# Patient Record
Sex: MALE | Race: WHITE
[De-identification: names, ages, dates, MRNs, and addresses within clinical notes are randomized per-mention and may not be internally consistent; named-entity substitution may affect disease eponyms.]

---

## 2018-06-06 ENCOUNTER — HOSPITAL ENCOUNTER (EMERGENCY)
Dept: HOSPITAL 97 - ER | Age: 58
Discharge: HOME | End: 2018-06-06
Payer: COMMERCIAL

## 2018-06-06 DIAGNOSIS — I10: ICD-10-CM

## 2018-06-06 DIAGNOSIS — F32.9: ICD-10-CM

## 2018-06-06 DIAGNOSIS — M25.512: Primary | ICD-10-CM

## 2018-06-06 DIAGNOSIS — F17.210: ICD-10-CM

## 2018-06-06 LAB
ALBUMIN SERPL BCP-MCNC: 3.8 G/DL (ref 3.2–5.5)
ALP SERPL-CCNC: 50 IU/L (ref 42–121)
ALT SERPL W P-5'-P-CCNC: 15 IU/L (ref 10–60)
AST SERPL W P-5'-P-CCNC: 19 IU/L (ref 10–42)
BUN BLD-MCNC: 18 MG/DL (ref 6–20)
GLUCOSE SERPLBLD-MCNC: 133 MG/DL (ref 65–120)
HCT VFR BLD CALC: 45.6 % (ref 39.6–49)
LYMPHOCYTES # SPEC AUTO: 2 K/UL (ref 0.7–4.9)
MCH RBC QN AUTO: 30.2 PG (ref 27–35)
MCV RBC: 87.4 FL (ref 80–100)
PMV BLD: 8.2 FL (ref 7.6–11.3)
POTASSIUM SERPL-SCNC: 3.3 MEQ/L (ref 3.6–5)
RBC # BLD: 5.21 M/UL (ref 4.33–5.43)

## 2018-06-06 PROCEDURE — 80053 COMPREHEN METABOLIC PANEL: CPT

## 2018-06-06 PROCEDURE — 93005 ELECTROCARDIOGRAM TRACING: CPT

## 2018-06-06 PROCEDURE — 85379 FIBRIN DEGRADATION QUANT: CPT

## 2018-06-06 PROCEDURE — 36415 COLL VENOUS BLD VENIPUNCTURE: CPT

## 2018-06-06 PROCEDURE — 99284 EMERGENCY DEPT VISIT MOD MDM: CPT

## 2018-06-06 PROCEDURE — 85025 COMPLETE CBC W/AUTO DIFF WBC: CPT

## 2018-06-06 PROCEDURE — 96374 THER/PROPH/DIAG INJ IV PUSH: CPT

## 2018-06-06 PROCEDURE — 84484 ASSAY OF TROPONIN QUANT: CPT

## 2018-06-06 PROCEDURE — 71045 X-RAY EXAM CHEST 1 VIEW: CPT

## 2018-06-06 PROCEDURE — 96361 HYDRATE IV INFUSION ADD-ON: CPT

## 2018-06-06 NOTE — EKG
Test Date:    2018-06-06               Test Time:    05:28:47

Technician:   CMC                                    

                                                     

MEASUREMENT RESULTS:                                       

Intervals:                                           

Rate:         82                                     

CA:           218                                    

QRSD:         122                                    

QT:           378                                    

QTc:          441                                    

Axis:                                                

P:            38                                     

CA:           218                                    

QRS:          -11                                    

T:            32                                     

                                                     

INTERPRETIVE STATEMENTS:                                       

                                                     

Sinus rhythm with 1st degree AV block

Borderline ECG

Compared to ECG 02/16/2018 22:11:44

First degree AV block now present



Electronically Signed On 06-06-18 07:35:35 CDT by Scooby Blackwood

## 2018-06-06 NOTE — ER
Nurse's Notes                                                                                     

 [facility]                                                                

Name: Shelton Palacios                                                                                

Age: 58 yrs                                                                                       

Sex: Male                                                                                         

: 1960                                                                                   

MRN: [id number]                                                                                   

Arrival Date: 2018                                                                          

Time: 02:21                                                                                       

Account#: [id number]                                                                            

Bed 6                                                                                             

Private MD: ASRANYA Saenz C                                                                            

Diagnosis: Pain in left shoulder                                                                  

                                                                                                  

Presentation:                                                                                     

                                                                                             

02:34 Presenting complaint: Patient states: he is having pain in his left arm axillary area   bb  

      also back pain pt states he thinks he has pleurisy and feels like he could "squeeze         

      under his arm and fluid would come out". Transition of care: patient was not received       

      from another setting of care. Onset of symptoms was 2018. Risk Assessment: Do      

      you want to hurt yourself or someone else? Patient reports no desire to harm self or        

      others. Initial Sepsis Screen: Does the patient meet any 2 criteria? No. Patient's          

      initial sepsis screen is negative. Does the patient have a suspected source of              

      infection? No. Patient's initial sepsis screen is negative. Care prior to arrival: None.    

02:34 Method Of Arrival: Ambulatory                                                           bb  

02:34 Acuity: JERE 3                                                                           bb  

                                                                                                  

Historical:                                                                                       

- Allergies:                                                                                      

02:37 No Known Allergies;                                                                     bb  

- Home Meds:                                                                                      

02:37 Depakote Oral [Active]; Seroquel Oral [Active]; Ambien Oral [Active]; unknown psych med bb  

      [Active];                                                                                   

- PMHx:                                                                                           

02:37 Depression; Hypertension;                                                               bb  

- PSHx:                                                                                           

02:37 back surgery; Cholecystectomy;                                                          bb  

                                                                                                  

- Immunization history:: Adult Immunizations up to date.                                          

- Social history:: Smoking status: Patient uses tobacco products, smokes one pack                 

  cigarettes per day. Patient uses alcohol, but reports only rare drinking.                       

  Patient/guardian denies using street drugs.                                                     

- Ebola Screening: : No symptoms or risks identified at this time.                                

                                                                                                  

                                                                                                  

Screenin:54 Abuse screen: Denies threats or abuse. Nutritional screening: No deficits noted.        ea  

      Tuberculosis screening: No symptoms or risk factors identified. Fall Risk None              

      identified.                                                                                 

                                                                                                  

Assessment:                                                                                       

02:52 General: Appears in no apparent distress. Behavior is calm, cooperative, appropriate    ea  

      for age. Pain: Complains of pain in left arm Pain currently is 9 out of 10 on a pain        

      scale. Quality of pain is described as aching. Neuro: Level of Consciousness is awake,      

      alert, obeys commands, Oriented to person, place, time, situation. Cardiovascular:          

      Patient's skin is warm and dry. Respiratory: Airway is patent Respiratory effort is         

      even, unlabored, Respiratory pattern is regular, symmetrical. GI: No signs and/or           

      symptoms were reported involving the gastrointestinal system. : No signs and/or           

      symptoms were reported regarding the genitourinary system. EENT: No signs and/or            

      symptoms were reported regarding the EENT system. Derm: Skin is pink, warm \T\ dry.         

      Musculoskeletal: Reports pain in left arm.                                                  

03:30 Reassessment: Patient and/or family updated on plan of care and expected duration. Pain ea  

      level reassessed. Patient is alert, oriented x 3, equal unlabored respirations, skin        

      warm/dry/pink.                                                                              

04:03 Reassessment: Patient and/or family updated on plan of care and expected duration. Pain ea  

      level reassessed. Patient is alert, oriented x 3, equal unlabored respirations, skin        

      warm/dry/pink.                                                                              

06:53 Reassessment: Patient and/or family updated on plan of care and expected duration. Pain ea  

      level reassessed. Patient is alert, oriented x 3, equal unlabored respirations, skin        

      warm/dry/pink. Discharge instructions given to patient, verbalized the understanding of     

      instruction.                                                                                

                                                                                                  

Vital Signs:                                                                                      

02:37  / 91; Pulse 115; Resp 20 S; Temp 99.1; Pulse Ox 96% on R/A; Weight 113.4 kg (R); bb  

      Height 5 ft. 10 in. (177.80 cm) (R); Pain 7/10;                                             

04:10  / 75; Pulse 85; Resp 18; Pulse Ox 96% on R/A;                                    ea  

06:56  / 68; Pulse 78; Resp 18; Pulse Ox 99% ; Pain 3/10;                               ea  

02:37 Body Mass Index 35.87 (113.40 kg, 177.80 cm)                                            bb  

                                                                                                  

ED Course:                                                                                        

02:21 Patient arrived in ED.                                                                  es  

02:21 SARANYA Saenz MD is Private Physician.                                                       es  

02:24 Karin Quinn, RN is Primary Nurse.                                                    ea  

02:26 Yariel Love MD is Attending Physician.                                             ps1 

02:36 Triage completed.                                                                       bb  

02:37 Arm band placed on Patient placed in an exam room, on a stretcher, on pulse oximetry.   bb  

02:52 X-ray completed. Portable x-ray completed in exam room. Patient tolerated procedure     kw  

      well.                                                                                       

02:52 Patient has correct armband on for positive identification. Bed in low position. Call   ea  

      light in reach.                                                                             

02:54 CXR XRAY In Process Unspecified.                                                        EDMS

03:19 Inserted saline lock: 22 gauge in right antecubital area, using aseptic technique.      mg2 

      Blood collected. by ELVA Frazier.                                                               

04:20 SARANYA Saenz MD is Referral Physician.                                                      ps1 

05:31 EKG done, by ED staff.                                                                  cc  

06:54 No provider procedures requiring assistance completed. IV discontinued, intact,         ea  

      bleeding controlled, No redness/swelling at site. Pressure dressing applied.                

                                                                                                  

Administered Medications:                                                                         

02:42 Drug: Motrin 800 mg Route: PO;                                                          ea  

06:58 Follow up: Response: No adverse reaction                                                ea  

03:18 Drug: Decadron - Dexamethasone 10 mg Route: IVP; Site: right antecubital;               mg2 

04:00 Follow up: Response: No adverse reaction; Pain is decreased                             ea  

03:19 Drug: NS 0.9% 1000 ml Route: IV; Rate: 1 bolus; Site: right antecubital;                mg2 

06:57 Follow up: Response: No adverse reaction; IV Status: Completed infusion                 ea  

                                                                                                  

                                                                                                  

Outcome:                                                                                          

04:21 Discharge ordered by MD.                                                                ps1 

06:54 Discharged to home ambulatory.                                                          ea  

06:54 Condition: good                                                                             

06:54 Discharge instructions given to patient, Instructed on discharge instructions, follow       

      up and referral plans. medication usage, Demonstrated understanding of instructions,        

      follow-up care, medications.                                                                

06:58 Patient left the ED.                                                                    ea  

                                                                                                  

Signatures:                                                                                       

Dispatcher [employer]                           EDMS                                                 

Yulia Franklin Brenda, RN RN bb Whitley, Kimberlee kw Christian, Chelsea                                                                              

Karin Quinn RN RN ea Singer, Phillip, MD MD   ps1                                                  

Ranjan Escobar RN                    RN   mg2                                                  

                                                                                                  

Corrections: (The following items were deleted from the chart)                                    

02:41 02:37  / 91; Pulse 120bpm; Resp 20bpm; Spontaneous; Pulse Ox 96% RA; Temp 99.1F;  bb  

      113.4 kg Reported; Height 5 ft. 10 in. Reported; BMI: 35.8; Pain 7/10; bb                   

                                                                                                  

**************************************************************************************************

## 2018-06-06 NOTE — EDPHYS
Physician Documentation                                                                           

 [facility]                                                                

Name: Shelton Palacios                                                                                

Age: 58 yrs                                                                                       

Sex: Male                                                                                         

: 1960                                                                                   

MRN: [id number]                                                                                   

Arrival Date: 2018                                                                          

Time: 02:21                                                                                       

Account#: [id number]                                                                            

Bed 6                                                                                             

Private MD: SARANYA Saenz C                                                                            

ED Physician Yariel Love                                                                      

HPI:                                                                                              

                                                                                             

04:22 This 58 yrs old  Male presents to ER via Ambulatory with complaints of Arm     ps1 

      Pain.                                                                                       

04:22 The complaints affect the left arm. Pain onset was 2 days ago. States that it is        ps1 

      non-traumatic. Hx of chronic pain. Sees  for back pain and post surgical         

      pain. Hx of drug abuse on oxycontin and fentanyl but has been clean for a year.             

      additionally states that he has pain in his armpit and worse with deep breathing. No        

      remitting factors. .                                                                        

                                                                                                  

Historical:                                                                                       

- Allergies:                                                                                      

02:37 No Known Allergies;                                                                     bb  

- Home Meds:                                                                                      

02:37 Depakote Oral [Active]; Seroquel Oral [Active]; Ambien Oral [Active]; unknown psych med bb  

      [Active];                                                                                   

- PMHx:                                                                                           

02:37 Depression; Hypertension;                                                               bb  

- PSHx:                                                                                           

02:37 back surgery; Cholecystectomy;                                                          bb  

                                                                                                  

- Immunization history:: Adult Immunizations up to date.                                          

- Social history:: Smoking status: Patient uses tobacco products, smokes one pack                 

  cigarettes per day. Patient uses alcohol, but reports only rare drinking.                       

  Patient/guardian denies using street drugs.                                                     

- Ebola Screening: : No symptoms or risks identified at this time.                                

                                                                                                  

                                                                                                  

ROS:                                                                                              

04:22 Constitutional: Negative for fever, chills, and weight loss, Eyes: Negative for injury, ps1 

      pain, redness, and discharge, ENT: Negative for injury, pain, and discharge,                

      Cardiovascular: Negative for chest pain, palpitations, and edema, Respiratory: Negative     

      for shortness of breath, cough, wheezing, and pleuritic chest pain, Abdomen/GI:             

      Negative for abdominal pain, nausea, vomiting, diarrhea, and constipation, Back:            

      Negative for injury and pain.                                                               

04:22 MS/extremity: Positive for pain, of the left arm.                                           

                                                                                                  

Exam:                                                                                             

04:22 Constitutional:  This is a well developed, well nourished patient who is awake, alert,  ps1 

      and in no acute distress. Head/Face:  Normocephalic, atraumatic. Eyes:  Pupils equal        

      round and reactive to light, extra-ocular motions intact.  Lids and lashes normal.          

      Conjunctiva and sclera are non-icteric and not injected. Chest/axilla:  Normal chest        

      wall appearance and motion.  Nontender with no deformity.  No lesions are appreciated.      

      Cardiovascular:  Regular rate and rhythm.  No gallops, murmurs, or rubs.  Normal PMI,       

      no JVD.  No pulse deficits. Respiratory:  Lungs have equal breath sounds bilaterally,       

      clear to auscultation and percussion.  No rales, rhonchi or wheezes noted.  No              

      increased work of breathing, no retractions or nasal flaring. Abdomen/GI:  Soft,            

      non-tender, with normal bowel sounds.  No distension or tympany.  No guarding or            

      rebound.  No evidence of tenderness throughout. Skin:  Warm, dry with normal turgor.        

      Normal color with no rashes, no lesions, and no evidence of cellulitis. MS/ Extremity:      

      Pulses equal, no cyanosis.  Neurovascular intact.  Full, normal range of motion. Neuro:     

       Awake and alert, GCS 15, oriented to person, place, time, and situation.  Cranial          

      nerves II-XII grossly intact. Sensory grossly intact.                                       

                                                                                                  

Vital Signs:                                                                                      

02:37  / 91; Pulse 115; Resp 20 S; Temp 99.1; Pulse Ox 96% on R/A; Weight 113.4 kg (R); bb  

      Height 5 ft. 10 in. (177.80 cm) (R); Pain 7/10;                                             

04:10  / 75; Pulse 85; Resp 18; Pulse Ox 96% on R/A;                                    ea  

06:56  / 68; Pulse 78; Resp 18; Pulse Ox 99% ; Pain 3/10;                               ea  

02:37 Body Mass Index 35.87 (113.40 kg, 177.80 cm)                                            bb  

                                                                                                  

MDM:                                                                                              

02:38 Patient medically screened.                                                             ps1 

06:02 Data reviewed: vital signs, nurses notes, lab test result(s), EKG.                      ps1 

                                                                                                  

                                                                                             

03:08 Order name: CBC with Diff; Complete Time: 05:52                                         ps1 

                                                                                             

03:08 Order name: CMP; Complete Time: 03:49                                                   ps1 

                                                                                             

02:35 Order name: CXR XRAY                                                                    ps1 

                                                                                             

03:08 Order name: D-Dimer; Complete Time: 04:02                                               ps1 

                                                                                             

03:08 Order name: Troponin (emerg Dept Use Only); Complete Time: 03:52                        ps1 

                                                                                             

04:26 Order name: EKG - Nurse/Tech; Complete Time: 05:31                                      ps1 

                                                                                                  

EC:28 Rate is 82 beats/min. Rhythm is regular. QRS Axis is Normal. MS interval is normal. QRS ps1 

      interval is prolonged. QT interval is normal. No Q waves. T waves are Normal. No ST         

      changes noted. Clinical impression: nsr with IVCD. . Interpreted by me.                     

                                                                                                  

Administered Medications:                                                                         

02:42 Drug: Motrin 800 mg Route: PO;                                                          ea  

06:58 Follow up: Response: No adverse reaction                                                ea  

03:18 Drug: Decadron - Dexamethasone 10 mg Route: IVP; Site: right antecubital;               mg2 

04:00 Follow up: Response: No adverse reaction; Pain is decreased                             ea  

03:19 Drug: NS 0.9% 1000 ml Route: IV; Rate: 1 bolus; Site: right antecubital;                mg2 

06:57 Follow up: Response: No adverse reaction; IV Status: Completed infusion                 ea  

                                                                                                  

                                                                                                  

Disposition:                                                                                      

18 04:21 Discharged to Home. Impression: Pain in left shoulder.                             

- Condition is Stable.                                                                            

- Discharge Instructions: Arthralgia.                                                             

- Prescriptions for Medrol (Yosef) 4 mg Oral Tablets, Dose Pack - take 1 tablet by ORAL             

  route as directed - follow package instructions; 1 packet.                                      

- Medication Reconciliation Form, Thank You Letter, Antibiotic Education, Prescription            

  Opioid Use form.                                                                                

- Follow up: SARANYA Saenz MD; When: As needed; Reason: Recheck today's complaints,                    

  Continuance of care, Re-evaluation by your physician. Follow up: Emergency                      

  Department; When: As needed; Reason: Fever > 102 F, Trouble breathing, Worsening of             

  condition.                                                                                      

- Problem is an ongoing problem.                                                                  

- Symptoms have improved.                                                                         

                                                                                                  

                                                                                                  

                                                                                                  

Signatures:                                                                                       

Dispatcher [employer]                           EDMS                                                 

Cher Patel RN RN   bb                                                   

Karin Quinn RN RN ea Singer, Phillip, MD MD   ps1                                                  

Ranjan Escobar RN                    RN   mg2                                                  

                                                                                                  

Corrections: (The following items were deleted from the chart)                                    

06:58 04:21 2018 04:21 Discharged to Home. Impression: Pain in left shoulder. Condition ea  

      is Stable. Forms are Medication Reconciliation Form, Thank You Letter, Antibiotic           

      Education, Prescription Opioid Use. Follow up: SARANYA Saenz; When: As needed; Reason: Recheck     

      today's complaints, Continuance of care, Re-evaluation by your physician. Follow up:        

      Emergency Department; When: As needed; Reason: Fever > 102 F, Trouble breathing,            

      Worsening of condition. Problem is an ongoing problem. Symptoms have improved. ps1          

                                                                                                  

**************************************************************************************************

## 2018-06-06 NOTE — RAD REPORT
EXAM DESCRIPTION:  Joo Single View6/6/2018 2:53 am

 

CLINICAL HISTORY:  Chest pain

 

COMPARISON:  February 2018

 

FINDINGS:   The lungs appear clear of acute infiltrate. The heart is normal size

 

IMPRESSION:   No acute abnormalities displayed

## 2018-08-16 ENCOUNTER — HOSPITAL ENCOUNTER (EMERGENCY)
Dept: HOSPITAL 97 - ER | Age: 58
Discharge: HOME | End: 2018-08-16
Payer: COMMERCIAL
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
## 2018-08-16 DIAGNOSIS — F17.210: ICD-10-CM

## 2018-08-16 DIAGNOSIS — F32.9: ICD-10-CM

## 2018-08-16 DIAGNOSIS — I10: ICD-10-CM

## 2018-08-16 DIAGNOSIS — Z23: ICD-10-CM

## 2018-08-16 DIAGNOSIS — S05.32XA: Primary | ICD-10-CM
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
## 2018-08-16 PROCEDURE — 99284 EMERGENCY DEPT VISIT MOD MDM: CPT

## 2018-08-16 PROCEDURE — 90714 TD VACC NO PRESV 7 YRS+ IM: CPT
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
## 2019-09-25 ENCOUNTER — HOSPITAL ENCOUNTER (EMERGENCY)
Dept: HOSPITAL 97 - ER | Age: 59
Discharge: HOME | End: 2019-09-25
Payer: COMMERCIAL

## 2019-09-25 VITALS — DIASTOLIC BLOOD PRESSURE: 83 MMHG | SYSTOLIC BLOOD PRESSURE: 132 MMHG | OXYGEN SATURATION: 95 %

## 2019-09-25 VITALS — TEMPERATURE: 98.4 F

## 2019-09-25 DIAGNOSIS — N20.0: ICD-10-CM

## 2019-09-25 DIAGNOSIS — F17.210: ICD-10-CM

## 2019-09-25 DIAGNOSIS — I10: ICD-10-CM

## 2019-09-25 DIAGNOSIS — F32.9: ICD-10-CM

## 2019-09-25 DIAGNOSIS — N39.0: Primary | ICD-10-CM

## 2019-09-25 LAB
ALBUMIN SERPL BCP-MCNC: 3.3 G/DL (ref 3.4–5)
ALP SERPL-CCNC: 54 U/L (ref 45–117)
ALT SERPL W P-5'-P-CCNC: 11 U/L (ref 12–78)
AST SERPL W P-5'-P-CCNC: 5 U/L (ref 15–37)
BUN BLD-MCNC: 17 MG/DL (ref 7–18)
GLUCOSE SERPLBLD-MCNC: 163 MG/DL (ref 74–106)
HCT VFR BLD CALC: 46.9 % (ref 39.6–49)
LIPASE SERPL-CCNC: 83 U/L (ref 73–393)
LYMPHOCYTES # SPEC AUTO: 2 K/UL (ref 0.7–4.9)
PMV BLD: 8.8 FL (ref 7.6–11.3)
POTASSIUM SERPL-SCNC: 4.1 MMOL/L (ref 3.5–5.1)
RBC # BLD: 5 M/UL (ref 4.33–5.43)
UA COMPLETE W REFLEX CULTURE PNL UR: (no result)

## 2019-09-25 PROCEDURE — 81003 URINALYSIS AUTO W/O SCOPE: CPT

## 2019-09-25 PROCEDURE — 80076 HEPATIC FUNCTION PANEL: CPT

## 2019-09-25 PROCEDURE — 74176 CT ABD & PELVIS W/O CONTRAST: CPT

## 2019-09-25 PROCEDURE — 87077 CULTURE AEROBIC IDENTIFY: CPT

## 2019-09-25 PROCEDURE — 85025 COMPLETE CBC W/AUTO DIFF WBC: CPT

## 2019-09-25 PROCEDURE — 96365 THER/PROPH/DIAG IV INF INIT: CPT

## 2019-09-25 PROCEDURE — 99284 EMERGENCY DEPT VISIT MOD MDM: CPT

## 2019-09-25 PROCEDURE — 96375 TX/PRO/DX INJ NEW DRUG ADDON: CPT

## 2019-09-25 PROCEDURE — 76377 3D RENDER W/INTRP POSTPROCES: CPT

## 2019-09-25 PROCEDURE — 87088 URINE BACTERIA CULTURE: CPT

## 2019-09-25 PROCEDURE — 81015 MICROSCOPIC EXAM OF URINE: CPT

## 2019-09-25 PROCEDURE — 83690 ASSAY OF LIPASE: CPT

## 2019-09-25 PROCEDURE — 87086 URINE CULTURE/COLONY COUNT: CPT

## 2019-09-25 PROCEDURE — 87186 SC STD MICRODIL/AGAR DIL: CPT

## 2019-09-25 PROCEDURE — 36415 COLL VENOUS BLD VENIPUNCTURE: CPT

## 2019-09-25 PROCEDURE — 80048 BASIC METABOLIC PNL TOTAL CA: CPT

## 2019-09-25 NOTE — ER
Nurse's Notes                                                                                     

 [facility]                                                                 

Name: Shelton Palacios                                                                                

Age: 59 yrs                                                                                       

Sex: Male                                                                                         

: 1960                                                                                   

MRN: [id number]                                                                                   

Arrival Date: 2019                                                                          

Time: 13:32                                                                                       

Account#: [id number]                                                                            

Bed 18                                                                                            

Private MD: SARANYA Saenz C                                                                            

Diagnosis: Low back pain;Urinary tract infection, site not specified;Calculus of kidney-left      

                                                                                                  

Presentation:                                                                                     

                                                                                             

13:35 Presenting complaint: Patient states: my back is very unhappy, i have a terrible back,  tw2 

      compression in discs, something is tapping into my lumbar area, it maybe my kidney          

      stone bothering me as well. Transition of care: patient was not received from another       

      setting of care. Onset of symptoms was 2019. Risk Assessment: Do you want     

      to hurt yourself or someone else? Patient reports no desire to harm self or others.         

      Initial Sepsis Screen: Does the patient meet any 2 criteria? No. Patient's initial          

      sepsis screen is negative. Does the patient have a suspected source of infection? No.       

      Patient's initial sepsis screen is negative. Care prior to arrival: None.                   

13:35 Method Of Arrival: Ambulatory                                                           tw2 

13:35 Acuity: JERE 3                                                                           tw2 

                                                                                                  

Triage Assessment:                                                                                

13:38 General: Appears uncomfortable, Behavior is calm, cooperative, appropriate for age,     tw2 

      Smells of cigarette smoke. Pain: Complains of pain in lumbar area. Musculoskeletal:         

      Range of motion: intact in all extremities.                                                 

                                                                                                  

Historical:                                                                                       

- Allergies:                                                                                      

13:37 No Known Allergies;                                                                     tw2 

- Home Meds:                                                                                      

13:37 Seroquel Oral [Active]; Depakote Oral [Active];                                         tw2 

- PMHx:                                                                                           

13:37 Depression; Hypertension;                                                               tw2 

- PSHx:                                                                                           

13:37 back surgery; Cholecystectomy;                                                          tw2 

                                                                                                  

- Immunization history:: Adult Immunizations up to date.                                          

- Social history:: Smoking status: Patient uses tobacco products, smokes two packs                

  cigarettes per day.                                                                             

- Ebola Screening: : Patient denies travel to an Ebola-affected area in the 21 days               

  before illness onset.                                                                           

                                                                                                  

                                                                                                  

Screenin:48 Abuse screen: Denies threats or abuse. Denies injuries from another. Nutritional        bp  

      screening: No deficits noted. Tuberculosis screening: No symptoms or risk factors           

      identified. Fall Risk None identified.                                                      

                                                                                                  

Assessment:                                                                                       

13:40 General: SEE TRIAGE NOTE. Neuro: No deficits noted.                                     bp  

15:08 Reassessment: LAB AND RAD RESULTS PENDING.                                              bp  

16:30 Reassessment: UOP OBTAINED, RESULTS PENDING.                                            bp  

17:42 Reassessment: PT D/C HOME AMBULATORY, DX WITH UTI.                                      bp  

                                                                                                  

Vital Signs:                                                                                      

13:37  / 85; Pulse 104; Resp 19; Temp 98.4(TE); Pulse Ox 96% on R/A; Weight 117.93 kg   tw2 

      (R); Height 5 ft. 10 in. (177.80 cm); Pain 10/10;                                           

15:07  / 106; Pulse 94; Resp 20; Pulse Ox 94% ;                                         bp  

16:14  / 83; Pulse 93; Resp 17; Pulse Ox 95% ;                                          bp  

13:37 Body Mass Index 37.31 (117.93 kg, 177.80 cm)                                            tw2 

13:37 "when it grabs its a 10, now sitting here 0"                                            tw2 

                                                                                                  

ED Course:                                                                                        

13:32 Patient arrived in ED.                                                                  dl4 

13:32 SARANYA Saenz MD is Private Physician.                                                       dl4 

13:36 Triage completed.                                                                       tw2 

13:37 Arm band placed on.                                                                     tw2 

13:46 George Ceja, ELVA is Primary Nurse.                                                    bp  

13:47 Obed Bolden PA is PHCP.                                                                cp  

13:47 Zackery Colvin MD is Attending Physician.                                              cp  

13:48 Patient has correct armband on for positive identification. Bed in low position. Call   bp  

      light in reach. Side rails up X2. Adult w/ patient.                                         

14:28 CT Stone Protocol: low back pain In Process Unspecified.                                EDMS

14:55 Missed attempt(s): 22 gauge in right forearm. Bleeding controlled, band aid applied,    dh3 

      catheter tip intact.                                                                        

15:01 Initial lab(s) drawn, by me, sent to lab. Inserted saline lock: 20 gauge in left        dh3 

      antecubital area, using aseptic technique. Blood collected.                                 

17:45 No provider procedures requiring assistance completed. IV discontinued, intact,         bp  

      bleeding controlled, No redness/swelling at site. Pressure dressing applied.                

                                                                                                  

Administered Medications:                                                                         

14:50 Drug: fentaNYL (PF) 25 mcg Route: IVP; Site: left antecubital;                          bp  

16:43 Follow up: Response: Pain is decreased                                                  bp  

17:10 Drug: Rocephin 1 grams Route: IV; Rate: calculated rate; Site: left antecubital;        bp  

17:46 Follow up: IV Status: Completed infusion; IV Intake: 50ml                               bp  

17:10 Drug: Hydrocodone-Acetaminophen (10 mg-500 mg) 1 tabs Route: PO;                        bp  

17:46 Follow up: Response: No adverse reaction                                                bp  

                                                                                                  

                                                                                                  

Intake:                                                                                           

17:46 IV: 50ml; Total: 50ml.                                                                  bp  

                                                                                                  

Outcome:                                                                                          

17:15 Discharge ordered by MD.                                                                cp  

17:45 Discharged to home ambulatory, with family.                                             bp  

17:45 Condition: stable                                                                           

17:45 Discharge instructions given to patient, Instructed on discharge instructions, follow       

      up and referral plans. medication usage, Demonstrated understanding of instructions,        

      follow-up care, medications, Prescriptions given X 4.                                       

17:46 Patient left the ED.                                                                    bp  

                                                                                                  

Addendum:                                                                                         

2019                                                                                        

     07:29 Addendum: Culture Results: Positive urine culture. No further action required. Bacteria s
s

           sensitive to prescribed antibiotic.                                                    

                                                                                                  

Signatures:                                                                                       

Dispatcher [employer]                           EDMS                                                 

More Love RN                      RN   ss                                                   

Obed Bolden PA PA cp Wise, Tara RN                          RN   2                                                  

Rosanna Galindo                              3                                                  

George Ceja RN                      RN   Adriano Thomas                                  dl4                                                  

                                                                                                  

**************************************************************************************************

## 2019-09-25 NOTE — EDPHYS
Physician Documentation                                                                           

 [facility]                                                                 

Name: Shelton Palacios                                                                                

Age: 59 yrs                                                                                       

Sex: Male                                                                                         

: 1960                                                                                   

MRN: [id number]                                                                                   

Arrival Date: 2019                                                                          

Time: 13:32                                                                                       

Account#: [id number]                                                                            

Bed 18                                                                                            

Private MD: SARANYA Saenz C                                                                            

ED Physician Zackery Colvin                                                                       

HPI:                                                                                              

                                                                                             

14:20 This 59 yrs old  Male presents to ER via Ambulatory with complaints of Back    cp  

      Pain.                                                                                       

14:20 The patient presents with pain that is chronic, worse over last several days. The       cp  

      symptoms are located in the lumbar area and left mid back. The pain does not radiate.       

      Associated signs and symptoms: Pertinent negatives: abdominal pain, chest pain,             

      constipation, dysuria, fever, hematuria, incontinence, numbness, urinary retention,         

      vomiting, weakness. The problem was sustained from unknown cause. Modifying factors:        

      the patient symptoms are aggravated by movement, walking. Severity of symptoms: in the      

      emergency department the symptoms are unchanged, despite home interventions.                

                                                                                                  

Historical:                                                                                       

- Allergies:                                                                                      

13:37 No Known Allergies;                                                                     tw2 

- Home Meds:                                                                                      

13:37 Seroquel Oral [Active]; Depakote Oral [Active];                                         tw2 

- PMHx:                                                                                           

13:37 Depression; Hypertension;                                                               tw2 

- PSHx:                                                                                           

13:37 back surgery; Cholecystectomy;                                                          tw2 

                                                                                                  

- Immunization history:: Adult Immunizations up to date.                                          

- Social history:: Smoking status: Patient uses tobacco products, smokes two packs                

  cigarettes per day.                                                                             

- Ebola Screening: : Patient denies travel to an Ebola-affected area in the 21 days               

  before illness onset.                                                                           

                                                                                                  

                                                                                                  

ROS:                                                                                              

14:30 Constitutional: Negative for body aches, chills, fever, poor PO intake.                 cp  

14:30 Eyes: Negative for injury, pain, redness, and discharge.                                cp  

14:30 ENT: Negative for drainage from ear(s), ear pain, sore throat, difficulty swallowing,       

      difficulty handling secretions.                                                             

14:30 Neck: Negative for pain with movement, pain at rest, stiffness.                             

14:30 Cardiovascular: Negative for chest pain, edema, palpitations.                               

14:30 Respiratory: Negative for cough, shortness of breath, wheezing.                             

14:30 Abdomen/GI: Negative for abdominal pain, nausea, vomiting, and diarrhea, constipation,      

      black/tarry stool, rectal bleeding, bowel incontinence.                                     

14:30 Back: Positive for pain at rest, pain with movement, of the lumbar area and left mid        

      back.                                                                                       

14:30 : Negative for urinary symptoms, bladder incontinence, testicular pain                    

14:30 Skin: Negative for rash.                                                                    

14:30 Neuro: Negative for altered mental status, headache, numbness, weakness.                    

14:30 All other systems are negative.                                                             

                                                                                                  

Exam:                                                                                             

14:35 Constitutional: The patient appears in no acute distress, alert, awake,                 cp  

      non-diaphoretic, non-toxic, well developed, well nourished, obese, uncomfortable.           

14:35 Head/Face:  Normocephalic, atraumatic.                                                  cp  

14:35 Eyes: Periorbital structures: appear normal, Conjunctiva: normal, no exudate, no            

      injection, Sclera: no appreciated abnormality, Lids and lashes: appear normal,              

      bilaterally.                                                                                

14:35 ENT: External ear(s): are unremarkable, Nose: is normal, Mouth: is normal, Posterior        

      pharynx: is normal, airway is patent, no erythema, no exudate.                              

14:35 Neck: ROM/movement: is normal, is supple, without pain, no range of motions                 

      limitations, no nuchal rigidity.                                                            

14:35 Chest/axilla: Inspection: normal, Palpation: is normal, no crepitus, no tenderness.         

14:35 Cardiovascular: Rate: tachycardic, Rhythm: regular, Edema: is not appreciated, JVD: is      

      not appreciated.                                                                            

14:35 Respiratory: the patient does not display signs of respiratory distress,  Respirations:     

      normal, no use of accessory muscles, no retractions, no splinting, no tachypnea,            

      labored breathing, is not present, Breath sounds: are clear throughout, no decreased        

      breath sounds, no stridor, no wheezing.                                                     

14:35 Abdomen/GI: Inspection: abdomen appears normal, Bowel sounds: active, all quadrants,        

      Palpation: abdomen is soft and non-tender, in all quadrants.                                

14:35 Back: pain, that is moderate, of the  lumbar area and left mid back, ROM is painful,        

      with all movement, Straight leg raises: of both lower extremities does not illicit pain.    

14:35 Skin: no rash present.                                                                      

14:35 Neuro: Orientation: to person, place \T\ time. Mentation: is normal, Motor: moves all       

      fours, strength is normal, Sensation: no obvious gross deficits, Gait: is steady, Deep      

      tendon reflexes are 2+ (normal) in the  right patellar, right Achilles, left patellar       

      and left Achilles.                                                                          

                                                                                                  

Vital Signs:                                                                                      

13:37  / 85; Pulse 104; Resp 19; Temp 98.4(TE); Pulse Ox 96% on R/A; Weight 117.93 kg   tw2 

      (R); Height 5 ft. 10 in. (177.80 cm); Pain 10/10;                                           

15:07  / 106; Pulse 94; Resp 20; Pulse Ox 94% ;                                         bp  

16:14  / 83; Pulse 93; Resp 17; Pulse Ox 95% ;                                          bp  

13:37 Body Mass Index 37.31 (117.93 kg, 177.80 cm)                                            tw2 

13:37 "when it grabs its a 10, now sitting here 0"                                            tw2 

                                                                                                  

MDM:                                                                                              

13:48 Patient medically screened.                                                             cp  

15:00 Differential diagnosis: Cholelithiasis chronic back pain, Fracture Osteomyelitis        cp  

      ruptured disc, spinal injury, Ureterolithiasis vertebral fracture.                          

17:14 Data reviewed: vital signs, nurses notes, lab test result(s), radiologic studies, CT    cp  

      scan.                                                                                       

17:14 Counseling: I had a detailed discussion with the patient and/or guardian regarding: the cp  

      historical points, exam findings, and any diagnostic results supporting the                 

      discharge/admit diagnosis, lab results, radiology results, to return to the emergency       

      department if symptoms worsen or persist or if there are any questions or concerns that     

      arise at home. Response to treatment: the patient's symptoms have markedly improved         

      after treatment, and as a result, I will discharge patient. ED course: VSS. Pain            

      improved with meds. CT negative for acute findings. Will discharge to home for              

      continued monitoring.                                                                       

                                                                                                  

                                                                                             

14:14 Order name: Basic Metabolic Panel; Complete Time: 15:53                                 cp  

                                                                                             

15:53 Interpretation: Normal except: GLUC 163; GFR 60.                                        cp  

                                                                                             

14:14 Order name: CBC with Diff; Complete Time: 15:53                                         cp  

                                                                                             

15:53 Interpretation: Normal except: MCV 93.8.                                                cp  

                                                                                             

14:14 Order name: Creatinine for Radiology; Complete Time: 15:53                              cp  

                                                                                             

14:14 Order name: Hepatic Function; Complete Time: 15:53                                      cp  

                                                                                             

15:53 Interpretation: Normal except: AST 5; ALT 11; ALB 3.3; GLOB 3.9; A/G 0.8.               cp  

                                                                                             

14:14 Order name: Lipase; Complete Time: 15:53                                                cp  

                                                                                             

14:14 Order name: Urine Microscopic Only                                                      cp  

                                                                                             

16:54 Order name: Urine Culture                                                                 

                                                                                             

17:34 Order name: Urine Dipstick--Ancillary (enter results)                                   sp  

                                                                                             

14:15 Order name: CT Stone Protocol: low back pain; Complete Time: 15:11                      cp  

                                                                                             

14:14 Order name: IV Saline Lock; Complete Time: 15:05                                          

                                                                                             

14:14 Order name: Labs collected and sent; Complete Time: 15:05                               cp  

                                                                                             

14:14 Order name: Urine Dipstick-Ancillary (obtain specimen); Complete Time: 16:43            cp  

                                                                                                  

Administered Medications:                                                                         

14:50 Drug: fentaNYL (PF) 25 mcg Route: IVP; Site: left antecubital;                          bp  

16:43 Follow up: Response: Pain is decreased                                                  bp  

17:10 Drug: Rocephin 1 grams Route: IV; Rate: calculated rate; Site: left antecubital;        bp  

17:46 Follow up: IV Status: Completed infusion; IV Intake: 50ml                               bp  

17:10 Drug: Hydrocodone-Acetaminophen (10 mg-500 mg) 1 tabs Route: PO;                        bp  

17:46 Follow up: Response: No adverse reaction                                                bp  

                                                                                                  

                                                                                                  

Disposition:                                                                                      

19 17:15 Discharged to Home. Impression: Low back pain, Urinary tract infection, site       

  not specified, Calculus of kidney - left.                                                       

- Condition is Stable.                                                                            

- Discharge Instructions: Back Pain, Adult, Kidney Stones, Urinary Tract Infection,               

  Adult, Back Exercises.                                                                          

- Prescriptions for Cipro 500 mg Oral Tablet - take 1 tablet by ORAL route every 12               

  hours for 10 days; 20 tablet. Cyclobenzaprine 10 mg Oral Tablet - take 1 tablet by              

  ORAL route every 8 hours As needed no driving while taking medication; 20 tablet.               

  Tylenol- Codeine #3 300-30 mg Oral Tablet - take 2 tablets by ORAL route every 8                

  hours As needed; 20 tablet. Ibuprofen 800 mg Oral Tablet - take 1 tablet by ORAL                

  route every 8 hours As needed take with food; 30 tablet.                                        

- Medication Reconciliation Form, Thank You Letter, Antibiotic Education, Prescription            

  Opioid Use form.                                                                                

- Follow up: Private Physician; When: 2 - 3 days; Reason: Recheck today's complaints.             

- Problem is new.                                                                                 

- Symptoms have improved.                                                                         

                                                                                                  

                                                                                                  

                                                                                                  

Addendum:                                                                                         

2019                                                                                        

     16:02 Co-signature as Attending Physician, Zackery Colvin MD I agree with the assessment and   k
dr

           plan of care.                                                                          

                                                                                                  

Signatures:                                                                                       

Dispatcher [employer]                           EDMS                                                 

Zackery Colvin MD MD   [facility]                                                  

Obed Bolden PA                         PA   cp                                                   

Lisa Gifford, RN                          RN   tw2                                                  

George Ceja RN                      RN   bp                                                   

                                                                                                  

Corrections: (The following items were deleted from the chart)                                    

                                                                                             

17:17 17:15 2019 17:15 Discharged to Home. Impression: Low back pain; Urinary tract     cp  

      infection, site not specified; Calculus of kidney. Condition is Stable. Forms are           

      Medication Reconciliation Form, Thank You Letter, Antibiotic Education, Prescription        

      Opioid Use. Follow up: Private Physician; When: 2 - 3 days; Reason: Recheck today's         

      complaints. Problem is new. Symptoms have improved. cp                                      

17:46 17:17 2019 17:15 Discharged to Home. Impression: Low back pain; Urinary tract     bp  

      infection, site not specified; Calculus of kidney - left. Condition is Stable.              

      Discharge Instructions: Back Pain, Adult, Kidney Stones, Urinary Tract Infection,           

      Adult, Back Exercises. Prescriptions for Cipro 500 mg Oral Tablet - take 1 tablet by        

      ORAL route every 12 hours for 10 days; 20 tablet, Cyclobenzaprine 10 mg Oral Tablet -       

      take 1 tablet by ORAL route every 8 hours As needed no driving while taking medication;     

      20 tablet. and Forms are Medication Reconciliation Form, Thank You Letter, Antibiotic       

      Education, Prescription Opioid Use. Follow up: Private Physician; When: 2 - 3 days;         

      Reason: Recheck today's complaints. Problem is new. Symptoms have improved. cp              

                                                                                                  

**************************************************************************************************

## 2019-09-25 NOTE — RAD REPORT
EXAM DESCRIPTION:  CT - Stone Protocol - 9/25/2019 2:27 pm

 

CLINICAL HISTORY:  Flank pain.

low back pain

 

COMPARISON:  Abdomen   Pelvis W Contrast dated 2/23/2017

 

TECHNIQUE:  Axial images were obtained without oral or IV contrast. Lack of contrast limits solid org
an and vascular assessment. The field-of-view spans the entirety of the  system partially obscuring
 uppermost abdomen and lung bases. Coronal reformatted images were obtained and reviewed.

 

All CT scans are performed using dose optimization technique as appropriate and may include automated
 exposure control or mA/KV adjustment according to patient size.

 

FINDINGS:  Linear atelectasis is present in the left lung base with a mildly elevated left hemidiaphr
agm.

 

Imaged portions of the liver and spleen show no suspicious findings on non-contrast imaging.Mild pneu
mobilia. The pancreas and adrenal glands are normal. No pathologic lymphadenopathy in the abdomen or 
pelvis.

 

11 mm stone is present in the inferior calyx of the left kidney with a few smaller adjacent caliceal 
calculi present. No stone is present in the ureter or bladder. No right-sided urinary tract stones se
en.

 

No bowel obstruction, free air, free fluid or abscess. Normal appendix noted.Moderate stool is presen
t in the colon.

 

No significant bony abnormality.

 

IMPRESSION:  Left nephrolithiasis is present without hydronephrosis.

## 2019-09-27 ENCOUNTER — HOSPITAL ENCOUNTER (EMERGENCY)
Dept: HOSPITAL 97 - ER | Age: 59
Discharge: HOME | End: 2019-09-27
Payer: COMMERCIAL

## 2019-09-27 VITALS — SYSTOLIC BLOOD PRESSURE: 126 MMHG | OXYGEN SATURATION: 99 % | DIASTOLIC BLOOD PRESSURE: 76 MMHG

## 2019-09-27 VITALS — TEMPERATURE: 99.5 F

## 2019-09-27 DIAGNOSIS — M54.5: Primary | ICD-10-CM

## 2019-09-27 DIAGNOSIS — F17.210: ICD-10-CM

## 2019-09-27 PROCEDURE — 72070 X-RAY EXAM THORAC SPINE 2VWS: CPT

## 2019-09-27 PROCEDURE — 72100 X-RAY EXAM L-S SPINE 2/3 VWS: CPT

## 2019-09-27 PROCEDURE — 99283 EMERGENCY DEPT VISIT LOW MDM: CPT

## 2019-09-27 NOTE — EDPHYS
Physician Documentation                                                                           

 [facility]                                                                 

Name: Shelton Palacios                                                                                

Age: 59 yrs                                                                                       

Sex: Male                                                                                         

: 1960                                                                                   

MRN: [id number]                                                                                   

Arrival Date: 2019                                                                          

Time: 07:28                                                                                       

Account#: [id number]                                                                            

Bed 20                                                                                            

Private MD: SARANYA Saenz C                                                                            

ED Physician Mandeep Portillo                                                                         

HPI:                                                                                              

                                                                                             

07:43 This 59 yrs old  Male presents to ER via Unassigned with complaints of Back    rn  

      Pain.                                                                                       

07:43 The patient presents with pain that is acute. The symptoms are located in the thoracic  rn  

      area and lumbar area. Onset: The symptoms/episode began/occurred 5 day(s) ago. The pain     

      does not radiate. Modifying factors: The patient symptoms are alleviated by nothing,        

      the patient symptoms are aggravated by any movement. Severity of symptoms: At their         

      worst the symptoms were moderate, in the emergency department the symptoms are              

      unchanged. The patient has experienced similar episodes in the past. Reports chronic        

      back pain for years, but 5 days ago increased back pain, no trauma, states thinks broke     

      vertebrae in lumbar region in his sleep, seen here, and neg ct stone, including             

      vertebrae, diagnosed with UTI and nephrolithiasis, went to see PCP, told believes is        

      broken vertebrae. Told needs xrays, and came back. No weakness of legs, no bowel or         

      bladder issues, no numbness..                                                               

                                                                                                  

Historical:                                                                                       

- Allergies:                                                                                      

07:47 No Known Allergies;                                                                     ss  

- PMHx:                                                                                           

07:47 Depression; Hypertension; spontaneous fractures of vertebrae;                           ss  

                                                                                                  

- Immunization history:: Adult Immunizations unknown.                                             

- Social history:: Smoking status: Patient uses tobacco products, smokes two packs                

  cigarettes per day.                                                                             

- Family history:: not pertinent.                                                                 

- Ebola Screening: : Patient denies exposure to infectious person Patient denies travel           

  to an Ebola-affected area in the 21 days before illness onset.                                  

- Hospitalizations: : No recent hospitalization is reported.                                      

                                                                                                  

                                                                                                  

ROS:                                                                                              

07:43 Constitutional: Negative for fever, chills, and weight loss, Eyes: Negative for injury, rn  

      pain, redness, and discharge, Neck: Negative for injury, pain, and swelling,                

      Cardiovascular: Negative for chest pain, palpitations, and edema, Respiratory: Negative     

      for shortness of breath, cough, wheezing, and pleuritic chest pain, Abdomen/GI:             

      Negative for abdominal pain, nausea, vomiting, diarrhea, and constipation, Back:            

      Negative for injury MS/Extremity: Negative for injury and deformity, Skin: Negative for     

      injury, rash, and discoloration, Neuro: Negative for headache, weakness, numbness,          

      tingling, and seizure.                                                                      

                                                                                                  

Exam:                                                                                             

07:43 Constitutional:  Overweight male, no acute distress, laying inclined in bed Head/Face:  rn  

      Normocephalic, atraumatic. Back:  FROM, mild tenderness upper lumbar vertebrae Skin:        

      Warm, dry with normal turgor.  Normal color with no rashes, no lesions, and no evidence     

      of cellulitis. MS/ Extremity:  Pulses equal, no cyanosis.  Neurovascular intact.  Full,     

      normal range of motion.  Equal circumference. Neuro:  Awake and alert, GCS 15, oriented     

      to person, place, time, and situation.  Cranial nerves II-XII grossly intact.  Motor        

      strength 5/5 in all extremities.  Sensory grossly intact.  Cerebellar exam normal.          

                                                                                                  

Vital Signs:                                                                                      

07:44  / 71; Pulse 105; Resp 18; Temp 99.5(O); Pulse Ox 98% on R/A; Weight 117.93 kg;   ss  

      Height 5 ft. 10 in. (177.80 cm); Pain 3/10;                                                 

08:45  / 76; Pulse 88; Resp 16; Pulse Ox 99% on R/A;                                    hb  

07:44 Body Mass Index 37.31 (117.93 kg, 177.80 cm)                                            ss  

                                                                                                  

MDM:                                                                                              

07:29 Patient medically screened.                                                             rn  

08:56 Differential diagnosis: arthritis, chronic back pain, Fatigue Fracture spinal injury,   rn  

      sprain, Ureterolithiasis vertebral fracture. Data reviewed: vital signs, nurses notes,      

      old medical records, radiologic studies, plain films, and as a result, I will discharge     

      patient. Counseling: I had a detailed discussion with the patient and/or guardian           

      regarding: the historical points, exam findings, and any diagnostic results supporting      

      the discharge/admit diagnosis, radiology results, the need for outpatient follow up, to     

      return to the emergency department if symptoms worsen or persist or if there are any        

      questions or concerns that arise at home. Special discussion: I discussed with the          

      patient/guardian in detail that at this point there is no indication for admission to       

      the hospital. It is understood, however, that if the symptoms persist or worsen the         

      patient needs to return immediately for re-evaluation. Based on the history and exam        

      findings, there is no indication for further emergent testing or inpatient evaluation.      

      I discussed with the patient/guardian the need to see the pain management specialist        

      for further evaluation of the symptoms.                                                     

08:56 Test interpretation: by ED physician or midlevel provider: plain radiologic studies,    rn  

      Xray thoracic and lumbar spine neg for acute fracture.                                      

                                                                                                  

                                                                                             

07:43 Order name: XRAY Thoracic Spine (Ap/lat); Complete Time: 08:56                          rn  

                                                                                             

07:43 Order name: XRAY Lumbar Spine (3 Views); Complete Time: 08:56                           rn  

                                                                                                  

Administered Medications:                                                                         

08:00 Drug: Norco 10 mg-325 mg 1 tabs Route: PO;                                              hb  

                                                                                                  

                                                                                                  

Disposition:                                                                                      

19 08:57 Discharged to Home. Impression: Low back pain.                                     

- Condition is Stable.                                                                            

- Discharge Instructions: Back Pain, Adult, Musculoskeletal Pain.                                 

                                                                                                  

- Medication Reconciliation Form, Thank You Letter, Antibiotic Education, Prescription            

  Opioid Use form.                                                                                

- Follow up: Private Physician; When: As needed; Reason: Recheck today's complaints,              

  Re-evaluation by your physician.                                                                

- Problem is an ongoing problem.                                                                  

- Symptoms have improved.                                                                         

                                                                                                  

                                                                                                  

                                                                                                  

Signatures:                                                                                       

Dispatcher [employer]                           EDMS                                                 

Mandeep Portillo MD MD rn Smirch, Shelby, RN RN                                                      

Barbie Mcleod RN                     RN                                                      

                                                                                                  

Corrections: (The following items were deleted from the chart)                                    

09:16 08:57 2019 08:57 Discharged to Home. Impression: Low back pain. Condition is      hb  

      Stable. Forms are Medication Reconciliation Form, Thank You Letter, Antibiotic              

      Education, Prescription Opioid Use. Follow up: Private Physician; When: As needed;          

      Reason: Recheck today's complaints, Re-evaluation by your physician. Problem is an          

      ongoing problem. Symptoms have improved. rn                                                 

                                                                                                  

**************************************************************************************************

## 2019-09-27 NOTE — RAD REPORT
EXAM DESCRIPTION:  RAD - Lumbar Spine 3 Views - 9/27/2019 8:24 am

 

CLINICAL HISTORY:  Back pain

 

FINDINGS:   The alignment of the lumbar spine is satisfactory.

 

No fracture or dislocation is seen.

 

Osteoporosis.

 

Mild spondylosis lower lumbar spine. Osteoarthritis involves the facet joints of lower lumbar spine

 

Left renal calculus seen on September 25, 2019 cat scan is again demonstrated

## 2019-09-27 NOTE — ER
Nurse's Notes                                                                                     

 [facility]                                                                 

Name: Shelton Palacios                                                                                

Age: 59 yrs                                                                                       

Sex: Male                                                                                         

: 1960                                                                                   

MRN: [id number]                                                                                   

Arrival Date: 2019                                                                          

Time: 07:28                                                                                       

Account#: [id number]                                                                            

Bed 20                                                                                            

Private MD: SARANYA Saenz C                                                                            

Diagnosis: Low back pain                                                                          

                                                                                                  

Presentation:                                                                                     

                                                                                             

07:45 Presenting complaint: Patient states: Patient believes that he has spontaneously        ss  

      fractured his L3 vertebrae during his sleep 4 days ago. Patient reports he has a            

      history of this before. Transition of care: patient was not received from another           

      setting of care. Transition of care: patient was not received from another setting of       

      care. Onset of symptoms is unknown. Risk Assessment: Do you want to hurt yourself or        

      someone else? Patient reports no desire to harm self or others. Initial Sepsis Screen:      

      Does the patient have a suspected source of infection? Yes: Other: Patient states,          

      "UTI". Initial Sepsis Screen: Does the patient meet any 2 criteria? HR > 90 bpm. Care       

      prior to arrival: None.                                                                     

07:45 Acuity: JERE 3                                                                           ss  

07:45 Method Of Arrival: Ambulatory                                                           ss  

                                                                                                  

Historical:                                                                                       

- Allergies:                                                                                      

07:47 No Known Allergies;                                                                     ss  

- PMHx:                                                                                           

07:47 Depression; Hypertension; spontaneous fractures of vertebrae;                           ss  

                                                                                                  

- Immunization history:: Adult Immunizations unknown.                                             

- Social history:: Smoking status: Patient uses tobacco products, smokes two packs                

  cigarettes per day.                                                                             

- Family history:: not pertinent.                                                                 

- Ebola Screening: : Patient denies exposure to infectious person Patient denies travel           

  to an Ebola-affected area in the 21 days before illness onset.                                  

- Hospitalizations: : No recent hospitalization is reported.                                      

                                                                                                  

                                                                                                  

Screenin:49 Abuse screen: Denies threats or abuse. Denies injuries from another. Nutritional        hb  

      screening: No deficits noted. Tuberculosis screening: No symptoms or risk factors           

      identified. Fall Risk None identified.                                                      

                                                                                                  

Assessment:                                                                                       

07:49 General: Appears in no apparent distress. Behavior is calm, cooperative. Pain: Pain     hb  

      currently is 3 out of 10 on a pain scale. Neuro: Level of Consciousness is awake,           

      alert, obeys commands, Oriented to person, place, time, situation. Cardiovascular:          

      Patient's skin is warm and dry. Respiratory: Airway is patent Respiratory effort is         

      even, unlabored, Respiratory pattern is regular, symmetrical. GI: No signs and/or           

      symptoms were reported involving the gastrointestinal system. : No signs and/or           

      symptoms were reported regarding the genitourinary system. EENT: No signs and/or            

      symptoms were reported regarding the EENT system. Derm: Skin is intact, is healthy with     

      good turgor. Musculoskeletal: Reports back pain 8/10.                                       

08:45 Reassessment: Patient appears in no apparent distress at this time. Patient and/or      hb  

      family updated on plan of care and expected duration. Pain level reassessed. Patient is     

      alert, oriented x 3, equal unlabored respirations, skin warm/dry/pink.                      

                                                                                                  

Vital Signs:                                                                                      

07:44  / 71; Pulse 105; Resp 18; Temp 99.5(O); Pulse Ox 98% on R/A; Weight 117.93 kg;   ss  

      Height 5 ft. 10 in. (177.80 cm); Pain 3/10;                                                 

08:45  / 76; Pulse 88; Resp 16; Pulse Ox 99% on R/A;                                    hb  

07:44 Body Mass Index 37.31 (117.93 kg, 177.80 cm)                                              

                                                                                                  

ED Course:                                                                                        

07:28 Patient arrived in ED.                                                                  rg4 

07:28 SARANYA Saenz MD is Private Physician.                                                       rg4 

07:29 Mandeep Portillo MD is Attending Physician.                                                rn  

07:44 Arm band placed on right wrist.                                                         ss  

07:46 Triage completed.                                                                       ss  

07:48 Barbie Mcleod, RN is Primary Nurse.                                                   hb  

07:49 Patient has correct armband on for positive identification. Bed in low position. Call   hb  

      light in reach.                                                                             

08:25 XRAY Thoracic Spine (Ap/lat) In Process Unspecified.                                    EDMS

08:25 XRAY Lumbar Spine (3 Views) In Process Unspecified.                                     EDMS

09:15 No provider procedures requiring assistance completed. Patient did not have IV access   hb  

      during this emergency room visit.                                                           

                                                                                                  

Administered Medications:                                                                         

08:00 Drug: Norco 10 mg-325 mg 1 tabs Route: PO;                                              hb  

                                                                                                  

                                                                                                  

Outcome:                                                                                          

08:57 Discharge ordered by MD.                                                                rn  

09:15 Discharged to home ambulatory, with significant other.                                  hb  

09:15 Condition: stable                                                                           

09:15 Discharge instructions given to patient, Instructed on discharge instructions, follow       

      up and referral plans. medication usage, Demonstrated understanding of instructions,        

      follow-up care, medications.                                                                

09:16 Patient left the ED.                                                                    hb  

                                                                                                  

Signatures:                                                                                       

Dispatcher [employer]                           EDMS                                                 

Mandeep Portillo MD MD rn Smirch, Shelby, RN                      RN   ss                                                   

Barbie Mcleod, RN                     RN   Bernarda Rojas                                 4                                                  

                                                                                                  

**************************************************************************************************

## 2019-09-27 NOTE — RAD REPORT
EXAM DESCRIPTION:  RAD - Thoracic Spine Ap/Lat - 9/27/2019 8:24 am

 

CLINICAL HISTORY:  Back pain

 

FINDINGS:  Kyphosis involves the thoracic spine. Osteoporosis.

 

Bridging osteophytes scan several lower thoracic vertebral bodies which may indicate diffuse idiopath
ic skeletal hyperostosis

 

No fracture or dislocation

 

Scoliosis involves the thoracic spine

## 2019-09-29 ENCOUNTER — HOSPITAL ENCOUNTER (EMERGENCY)
Dept: HOSPITAL 97 - ER | Age: 59
Discharge: HOME | End: 2019-09-29
Payer: COMMERCIAL

## 2019-09-29 DIAGNOSIS — I10: ICD-10-CM

## 2019-09-29 DIAGNOSIS — F32.9: ICD-10-CM

## 2019-09-29 DIAGNOSIS — F17.210: ICD-10-CM

## 2019-09-29 DIAGNOSIS — M54.5: Primary | ICD-10-CM

## 2019-09-29 LAB
ALBUMIN SERPL BCP-MCNC: 3 G/DL (ref 3.4–5)
ALP SERPL-CCNC: 48 U/L (ref 45–117)
ALT SERPL W P-5'-P-CCNC: 11 U/L (ref 12–78)
AST SERPL W P-5'-P-CCNC: 9 U/L (ref 15–37)
BLD SMEAR INTERP: (no result)
BUN BLD-MCNC: 14 MG/DL (ref 7–18)
GLUCOSE SERPLBLD-MCNC: 187 MG/DL (ref 74–106)
HCT VFR BLD CALC: 43.9 % (ref 39.6–49)
LYMPHOCYTES # SPEC AUTO: 1.9 K/UL (ref 0.7–4.9)
MORPHOLOGY BLD-IMP: (no result)
PMV BLD: 7.8 FL (ref 7.6–11.3)
POTASSIUM SERPL-SCNC: 3.9 MMOL/L (ref 3.5–5.1)
RBC # BLD: 4.68 M/UL (ref 4.33–5.43)

## 2019-09-29 PROCEDURE — 96372 THER/PROPH/DIAG INJ SC/IM: CPT

## 2019-09-29 PROCEDURE — 80053 COMPREHEN METABOLIC PANEL: CPT

## 2019-09-29 PROCEDURE — 99283 EMERGENCY DEPT VISIT LOW MDM: CPT

## 2019-09-29 PROCEDURE — 85025 COMPLETE CBC W/AUTO DIFF WBC: CPT

## 2019-09-29 PROCEDURE — 36415 COLL VENOUS BLD VENIPUNCTURE: CPT

## 2019-09-29 NOTE — EDPHYS
Physician Documentation                                                                           

 [facility]                                                                 

Name: Shelton Palacios                                                                                

Age: 59 yrs                                                                                       

Sex: Male                                                                                         

: 1960                                                                                   

MRN: [id number]                                                                                   

Arrival Date: 2019                                                                          

Time: 21:18                                                                                       

Account#: [id number]                                                                            

Bed 25                                                                                            

Private MD:                                                                                       

ED Physician Yariel Love                                                                      

HPI:                                                                                              

                                                                                             

21:55 This 59 yrs old  Male presents to ER via Wheelchair with complaints of Back    ps1 

      Pain.                                                                                       

21:55 Patient presenting with chronic back pain. Patient has been seen and evaluated          ps1 

      previously for same complaint. He had plain film and CT stone protocol performed, no        

      mention of emergent condition in imaging. Patient states that his complaints are            

      atruamatic, no mention of fever, not immunocompromised, no hx of IVDA. No urinary           

      complaints, urgency or incontinence. No saddle anesthesia or fecal incontinence. Not on     

      chronic steroids. Hx of multiple back surgeries per patient 15 years ago. Hx of opioid      

      dependence, stopped 3 years ago. Previously on oxycontin and xanax. States that he has      

      been taking T3 and 1.5g Acetaminophen tid and 800mg Motrin. .                               

                                                                                                  

Historical:                                                                                       

- Allergies:                                                                                      

21:29 No Known Allergies;                                                                     mg2 

- Home Meds:                                                                                      

21:29 Depakote Oral [Active]; Seroquel Oral [Active];                                         mg2 

- PMHx:                                                                                           

21:29 Depression; Hypertension; spontaneous fractures of vertebrae;                           mg2 

- PSHx:                                                                                           

21:29 Cholecystectomy; hand sx;                                                               mg2 

                                                                                                  

- Immunization history:: Flu vaccine is not up to date.                                           

- Social history:: Smoking status: Patient uses tobacco products, smokes one pack                 

  cigarettes per day. Patient uses alcohol, but reports only rare drinking. street                

  drugs, Patient/guardian denies using street drugs, IV drugs.                                    

- Ebola Screening: : No symptoms or risks identified at this time.                                

                                                                                                  

                                                                                                  

ROS:                                                                                              

21:55 Constitutional: Negative for fever, chills, and weight loss, Eyes: Negative for injury, ps1 

      pain, redness, and discharge, Cardiovascular: Negative for chest pain, palpitations,        

      and edema, Respiratory: Negative for shortness of breath, cough, wheezing, and              

      pleuritic chest pain, Abdomen/GI: Negative for abdominal pain, nausea, vomiting,            

      diarrhea, and constipation, MS/Extremity: Negative for injury and deformity, Skin:          

      Negative for injury, rash, and discoloration, Neuro: Negative for headache, weakness,       

      numbness, tingling, and seizure.                                                            

21:55 Back: Positive for pain with movement.                                                      

                                                                                                  

Exam:                                                                                             

21:55 Constitutional:  This is a well developed, well nourished patient who is awake, alert,  ps1 

      and in no acute distress. Head/Face:  Normocephalic, atraumatic. Eyes:  Pupils equal        

      round and reactive to light, extra-ocular motions intact.  Lids and lashes normal.          

      Conjunctiva and sclera are non-icteric and not injected. ENT:  Nares patent. No nasal       

      discharge, no septal abnormalities noted.  Tympanic membranes are normal and external       

      auditory canals are clear.  Oropharynx with no redness, swelling, or masses, exudates,      

      or evidence of obstruction, uvula midline.  Mucous membranes moist. Cardiovascular:         

      Regular rate and rhythm.  No gallops, murmurs, or rubs.  Normal PMI, no JVD.  No pulse      

      deficits. Respiratory:  Lungs have equal breath sounds bilaterally, clear to                

      auscultation and percussion.  No rales, rhonchi or wheezes noted.  No increased work of     

      breathing, no retractions or nasal flaring. Abdomen/GI:  Soft, non-tender, with normal      

      bowel sounds.  No distension or tympany.  No guarding or rebound.  No evidence of           

      tenderness throughout. Skin:  Warm, dry with normal turgor.  Normal color with no           

      rashes, no lesions, and no evidence of cellulitis. MS/ Extremity:  Pulses equal, no         

      cyanosis.  Neurovascular intact.  Full, normal range of motion. Neuro:  Awake and           

      alert, GCS 15, oriented to person, place, time, and situation.  Cranial nerves II-XII       

      grossly intact. Sensory grossly intact. Psych:  Awake, alert, with orientation to           

      person, place and time.  Behavior, mood, and affect are within normal limits.               

21:55 Back: pain, that is mild, ROM is painful.                                                   

                                                                                                  

Vital Signs:                                                                                      

21:25  / 74; Pulse 105; Resp 18; Temp 98.8; Pulse Ox 94% on R/A; Weight 117.93 kg;      mg2 

      Height 5 ft. 10 in. (177.80 cm); Pain 3/10;                                                 

21:25 Body Mass Index 37.31 (117.93 kg, 177.80 cm)                                            mg2 

                                                                                                  

MDM:                                                                                              

22:01 Patient medically screened.                                                             ps1 

22:48 Data reviewed: vital signs, nurses notes, old medical records, previous CT evaluated.   ps1 

      No obvious bony entrance into canal or stenosis concerning for central cord syndrome.       

      Previous radiology read did not comment on spine. lab test result(s), and as a result,      

      I will discharge patient. Counseling: I had a detailed discussion with the patient          

      and/or guardian regarding: the historical points, exam findings, and any diagnostic         

      results supporting the discharge/admit diagnosis, radiology results, the need for           

      outpatient follow up, PCP and pain management for further diagnostic testing. Patient       

      needs OP MRI for further evaluation. Labs normal and history not concerning for central     

      cord syndrome. .                                                                            

22:52 ED course: Patient liver enzymes not consistent with tylenol OD. No RUQ pain. Will have ps1 

      patient stop taking tylenol while taking T3. .                                              

                                                                                                  

                                                                                             

22:01 Order name: CBC with Diff                                                               ps1 

                                                                                             

22:01 Order name: CMP; Complete Time: 22:46                                                   ps1 

                                                                                             

23:38 Order name: CBC Smear Scan                                                              EDMS

                                                                                                  

Administered Medications:                                                                         

23:23 Drug: TORadol - Ketorolac 15 mg Route: IM; Site: right deltoid;                         tr5 

23:29 Follow up: Response: Medication administered at discharge.                              tr5 

                                                                                                  

                                                                                                  

Disposition:                                                                                      

19 22:55 Discharged to Home. Impression: Low back pain.                                     

- Condition is Stable.                                                                            

- Discharge Instructions: Back Pain, Adult.                                                       

- Prescriptions for tramadol 200 mg Oral capsule,ER biphase 24 hr 25- 75 - take 1                 

  capsule by ORAL route once daily; 10 capsule. Medrol (Yosef) 4 mg Oral Tablets, Dose              

  Pack - take 1 tablet by ORAL route as directed - follow package instructions; 1                 

  packet.                                                                                         

- Medication Reconciliation Form, Thank You Letter, Antibiotic Education, Prescription            

  Opioid Use form.                                                                                

- Follow up: Simon Saenz MD; When: 48 Hours; Reason: Recheck today's complaints,                

  Continuance of care, Re-evaluation by your physician. Follow up: Emergency                      

  Department; When: As needed; Reason: Fever > 102 F, Worsening of condition.                     

- Problem is chronic.                                                                             

- Symptoms are unchanged.                                                                         

                                                                                                  

                                                                                                  

                                                                                                  

Signatures:                                                                                       

Dispatcher [employer]                           EDMS                                                 

Yariel Love MD MD   ps1                                                  

Ranjan Escobar RN RN   mg2                                                  

Josue Bernstein RN                   RN   tr5                                                  

                                                                                                  

Corrections: (The following items were deleted from the chart)                                    

22:00 21:55 Patient presenting with chronic back pain. Patient has been seen and evaluated    ps1 

      previously for same complaint. He had plain film and CT stone protocol performed, no        

      mention of emergent condition in imaging. Patient states that his complaints are            

      atruamatic, no mention of fever, not immunocompromised, no hx of IVDA. Not on chronic       

      steroids. Hx of multiple back surgeries per patient 15 years ago. Hx of opioid              

      dependence, stopped 3 years ago. Previously on oxycontin and xanax. . ps1                   

22:01 21:55 Patient presenting with chronic back pain. Patient has been seen and evaluated    ps1 

      previously for same complaint. He had plain film and CT stone protocol performed, no        

      mention of emergent condition in imaging. Patient states that his complaints are            

      atruamatic, no mention of fever, not immunocompromised, no hx of IVDA. No urinary           

      complaints, urgency or incontinence. No saddle anesthesia or fecal incontinence. Not on     

      chronic steroids. Hx of multiple back surgeries per patient 15 years ago. Hx of opioid      

      dependence, stopped 3 years ago. Previously on oxycontin and xanax. . ps1                   

23:48 22:55 2019 22:55 Discharged to Home. Impression: Low back pain. Condition is      tr5 

      Stable. Forms are Medication Reconciliation Form, Thank You Letter, Antibiotic              

      Education, Prescription Opioid Use. Follow up: Simon Saenz; When: 48 Hours; Reason:         

      Recheck today's complaints, Continuance of care, Re-evaluation by your physician.           

      Follow up: Emergency Department; When: As needed; Reason: Fever > 102 F, Worsening of       

      condition. Problem is chronic. Symptoms are unchanged. ps1                                  

                                                                                                  

**************************************************************************************************

## 2019-09-29 NOTE — ER
Nurse's Notes                                                                                     

 [facility]                                                                 

Name: Shelton Palacios                                                                                

Age: 59 yrs                                                                                       

Sex: Male                                                                                         

: 1960                                                                                   

MRN: [id number]                                                                                   

Arrival Date: 2019                                                                          

Time: 21:18                                                                                       

Account#: [id number]                                                                            

Bed 25                                                                                            

Private MD:                                                                                       

Diagnosis: Low back pain                                                                          

                                                                                                  

Presentation:                                                                                     

                                                                                             

21:23 Presenting complaint: Patient states: i had vertebral fusion done last  and it      mg2 

      failed after a year. i have chronic back pain since then. Transition of care: patient       

      was not received from another setting of care. Onset of symptoms was 2019. Risk Assessment: Do you want to hurt yourself or someone else? Patient reports no     

      desire to harm self or others. Initial Sepsis Screen: Does the patient meet any 2           

      criteria? No. Patient's initial sepsis screen is negative. Does the patient have a          

      suspected source of infection? No. Patient's initial sepsis screen is negative. Care        

      prior to arrival: None.                                                                     

21:23 Method Of Arrival: Wheelchair                                                           mg2 

21:23 Acuity: JERE 4                                                                           mg2 

                                                                                                  

Historical:                                                                                       

- Allergies:                                                                                      

21:29 No Known Allergies;                                                                     mg2 

- Home Meds:                                                                                      

21:29 Depakote Oral [Active]; Seroquel Oral [Active];                                         mg2 

- PMHx:                                                                                           

21:29 Depression; Hypertension; spontaneous fractures of vertebrae;                           mg2 

- PSHx:                                                                                           

21:29 Cholecystectomy; hand sx;                                                               mg2 

                                                                                                  

- Immunization history:: Flu vaccine is not up to date.                                           

- Social history:: Smoking status: Patient uses tobacco products, smokes one pack                 

  cigarettes per day. Patient uses alcohol, but reports only rare drinking. street                

  drugs, Patient/guardian denies using street drugs, IV drugs.                                    

- Ebola Screening: : No symptoms or risks identified at this time.                                

                                                                                                  

                                                                                                  

Screenin:57 Abuse screen: Denies threats or abuse. Nutritional screening: No deficits noted.        tr5 

      Tuberculosis screening: No symptoms or risk factors identified. Fall Risk None              

      identified.                                                                                 

                                                                                                  

Assessment:                                                                                       

22:57 General: Appears uncomfortable, Behavior is calm, cooperative, appropriate for age.     tr5 

      Pain: Complains of pain in back Pain currently is 8 out of 10 on a pain scale. Quality      

      of pain is described as aching, Pain began years ago. Is chronic. Neuro: Level of           

      Consciousness is awake, alert, obeys commands, Oriented to person, place, time,        

      are equal bilaterally Moves all extremities. Cardiovascular: Heart tones present            

      Capillary refill < 3 seconds. Respiratory: Airway is patent Respiratory effort is even,     

      unlabored, Respiratory pattern is regular, symmetrical. GI: No signs and/or symptoms        

      were reported involving the gastrointestinal system. : No signs and/or symptoms were      

      reported regarding the genitourinary system. EENT: No signs and/or symptoms were            

      reported regarding the EENT system. Derm: No signs and/or symptoms reported regarding       

      the dermatologic system. Musculoskeletal: No signs and/or symptoms reported regarding       

      the musculoskeletal system.                                                                 

                                                                                                  

Vital Signs:                                                                                      

21:25  / 74; Pulse 105; Resp 18; Temp 98.8; Pulse Ox 94% on R/A; Weight 117.93 kg;      mg2 

      Height 5 ft. 10 in. (177.80 cm); Pain 3/10;                                                 

21:25 Body Mass Index 37.31 (117.93 kg, 177.80 cm)                                            mg2 

                                                                                                  

ED Course:                                                                                        

21:18 Patient arrived in ED.                                                                  cf2 

21:22 Yariel Love MD is Attending Physician.                                             ps1 

21:25 Triage completed.                                                                       mg2 

21:29 Arm band placed on.                                                                     mg2 

21:50 Josue Bernstein, RN is Primary Nurse.                                                 tr5 

22:19 Initial lab(s) drawn, by me, sent to lab.                                               jp3 

22:20 CMP Sent.                                                                               jp3 

22:20 CBC with Diff Sent.                                                                     jp3 

22:54 Simon Saenz MD is Referral Physician.                                                 ps1 

22:57 Call light in reach. Side rails up X 1.                                                 tr5 

23:47 No provider procedures requiring assistance completed. Patient did not have IV access   tr5 

      during this emergency room visit.                                                           

                                                                                                  

Administered Medications:                                                                         

23:23 Drug: TORadol - Ketorolac 15 mg Route: IM; Site: right deltoid;                         tr5 

23:29 Follow up: Response: Medication administered at discharge.                              tr5 

                                                                                                  

                                                                                                  

Outcome:                                                                                          

22:55 Discharge ordered by MD.                                                                ps1 

23:47 Discharged to home ambulatory.                                                          tr5 

23:47 Condition: stable                                                                           

23:47 Discharge instructions given to patient, Instructed on discharge instructions, follow       

      up and referral plans. medication usage, Demonstrated understanding of instructions,        

      follow-up care, medications, Prescriptions given X 2.                                       

23:48 Patient left the ED.                                                                    tr5 

                                                                                                  

Signatures:                                                                                       

Yariel Love MD MD   ps1                                                  

Ranjan Escobar RN                    RN   mg2                                                  

Charlie Grossman                              jp3                                                  

Amandeep, Josue, RN                   RN   tr5                                                  

Coleman Sweet                             cf2                                                  

                                                                                                  

**************************************************************************************************

## 2019-09-30 VITALS — DIASTOLIC BLOOD PRESSURE: 74 MMHG | OXYGEN SATURATION: 94 % | TEMPERATURE: 98.8 F | SYSTOLIC BLOOD PRESSURE: 107 MMHG
